# Patient Record
Sex: MALE | Race: WHITE | NOT HISPANIC OR LATINO
[De-identification: names, ages, dates, MRNs, and addresses within clinical notes are randomized per-mention and may not be internally consistent; named-entity substitution may affect disease eponyms.]

---

## 2017-02-07 ENCOUNTER — RECORDS - HEALTHEAST (OUTPATIENT)
Dept: ADMINISTRATIVE | Facility: OTHER | Age: 47
End: 2017-02-07

## 2017-11-07 ENCOUNTER — RECORDS - HEALTHEAST (OUTPATIENT)
Dept: ADMINISTRATIVE | Facility: OTHER | Age: 47
End: 2017-11-07

## 2018-01-16 ENCOUNTER — OFFICE VISIT - HEALTHEAST (OUTPATIENT)
Dept: FAMILY MEDICINE | Facility: CLINIC | Age: 48
End: 2018-01-16

## 2018-01-16 DIAGNOSIS — Z23 IMMUNIZATION DUE: ICD-10-CM

## 2018-01-16 DIAGNOSIS — L73.9 NASAL FOLLICULITIS: ICD-10-CM

## 2018-01-16 ASSESSMENT — MIFFLIN-ST. JEOR: SCORE: 1639.74

## 2018-11-24 ENCOUNTER — OFFICE VISIT (OUTPATIENT)
Dept: URGENT CARE | Facility: URGENT CARE | Age: 48
End: 2018-11-24
Payer: COMMERCIAL

## 2018-11-24 VITALS
WEIGHT: 175 LBS | DIASTOLIC BLOOD PRESSURE: 74 MMHG | HEART RATE: 66 BPM | BODY MASS INDEX: 27.47 KG/M2 | SYSTOLIC BLOOD PRESSURE: 110 MMHG | OXYGEN SATURATION: 98 % | TEMPERATURE: 97.6 F | HEIGHT: 67 IN
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
## 2018-11-24 DIAGNOSIS — S51.851A INFECTED CAT BITE OF FOREARM, RIGHT, INITIAL ENCOUNTER: Primary | ICD-10-CM

## 2018-11-24 DIAGNOSIS — L08.9 INFECTED CAT BITE OF FOREARM, RIGHT, INITIAL ENCOUNTER: Primary | ICD-10-CM

## 2018-11-24 PROCEDURE — 99203 OFFICE O/P NEW LOW 30 MIN: CPT | Mod: 25 | Performed by: INTERNAL MEDICINE

## 2018-11-24 PROCEDURE — 90471 IMMUNIZATION ADMIN: CPT | Performed by: INTERNAL MEDICINE

## 2018-11-24 PROCEDURE — 90715 TDAP VACCINE 7 YRS/> IM: CPT | Performed by: INTERNAL MEDICINE

## 2018-11-24 RX ORDER — ESCITALOPRAM OXALATE 10 MG/1
15 TABLET ORAL
COMMUNITY
Start: 2018-10-03

## 2018-11-24 RX ORDER — TEMAZEPAM 15 MG/1
CAPSULE ORAL
COMMUNITY
Start: 2018-10-23

## 2018-11-24 ASSESSMENT — ENCOUNTER SYMPTOMS
FATIGUE: 1
FEVER: 0

## 2018-11-24 NOTE — PATIENT INSTRUCTIONS
augmentin 875 2 x day 1 week  Yogurt, probiotics    Call or return to clinic if symptoms worsen or fail to improve as anticipated.          Cat Bite    A cat bite can cause a wound deep enough to break the skin. In such cases, the wound is cleaned and then sometimes closed. If the wound is closed it is usually not closed completely. This is so that fluid can drain if the wound becomes infected. Often the wound is left open to heal. In addition to wound care, a tetanus shot may be given, if needed.  Home care    Wash your hands well with soap and warm water before and after caring for the wound. This helps lower the risk of infection.    Care for the wound as directed. If a dressing was applied to the wound, be sure to change it as directed.    If the wound bleeds, place a clean, soft cloth on the wound. Then firmly apply pressure until the bleeding stops. This may take up to 5 minutes. Don't release the pressure and look at the wound during this time.    Always get medical attention for cat bites on the hand. They are highly likely to become infected.    Most wounds heal within 10 days. But an infection can occur even with proper treatment. So be sure to check the wound daily for signs of infection (see below).    Antibiotics may be prescribed. These help prevent or treat infection. If you re given antibiotics, take them as directed. Also be sure to complete the medicines.  Rabies prevention  Rabies is a virus that can be carried in certain animals. These can include domestic animals such as cats and dogs. Pets fully vaccinated against rabies (2 shots) are at very low risk of infection. But because human rabies is almost always fatal, any biting pet should be confined for 10 days as an extra precaution. In general, if there is a risk for rabies, the following steps may need to be taken:    If someone s pet cat has bitten you, it should be kept in a secure area for the next 10 days to watch for signs of illness. If  the pet owner won t allow this, contact your local animal control center. If the cat becomes ill or dies during that time, contact your local animal control center at once so the animal may be tested for rabies. If the cat stays healthy for the next 10 days, there is no danger of rabies in the animal or you.    If a stray cat bit you, contact your local animal control center. They can give information on capture, quarantine, and animal rabies testing.    If you can t find the animal that bit you in the next 2 days, and if rabies exists in your area, you may need to receive the rabies vaccine series. Call your healthcare provider right away. Or return to the emergency department promptly.    All animal bites should be reported to the local animal control center. If you were not given a form to fill out, you can report this yourself.  Follow-up care  Follow up with your healthcare provider, or as directed.  When to seek medical advice  Call your healthcare provider right away if any of these occur:    Signs of infection:  ? Spreading redness or warmth from the wound  ? Increased pain or swelling  ? Fever of 100.4 F (38 C) or higher, or as directed by your healthcare provider  ? Colored fluid or pus draining from the wound  ? Enlarged lymph nodes above the area that was bitten, such as lymph nodes in the armpit if you were bitten on the hand or arm. This may be a sign of cat-scratch disease (cat-scratch fever).    Signs of rabies infection:  ? Headache  ? Confusion  ? Strange behavior  ? Increased salivating or drooling  ? Seizure    Decreased ability to move any body part near the bite area    Bleeding that can't be stopped after 5 minutes of firm pressure   Date Last Reviewed: 5/1/2018 2000-2018 [employer]. [address] [place] [address]. All rights reserved. This information is not intended as a substitute for professional medical care. Always follow your healthcare professional's  instructions.

## 2018-11-24 NOTE — PROGRESS NOTES
"SUBJECTIVE:   Kam Rose is a 48 year old male presenting with a chief complaint of   Chief Complaint   Patient presents with     Urgent Care     Cat Bite     c/o cat bite for 1 day       He is a new patient of [facility].    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  Cat is up-to-date on shots  Last night this cat bit him twice on his right forearm.  He states the bites were very painful and deep  He noticed this morning developing redness and swelling around 6 of the bite sites  Current and Associated symptoms: redness, warmth and pain  Treatment measures tried include: washed, bactine, h2O2, alcohol        Review of Systems   Constitutional: Positive for fatigue. Negative for fever.     He has a history of postconcussion syndrome and did have some word difficulty finding today    Past Medical History:   Diagnosis Date     Post concussive syndrome      No family history on file.  Current Outpatient Prescriptions   Medication Sig Dispense Refill     amoxicillin-clavulanate (AUGMENTIN) 875-125 MG per tablet Take 1 tablet by mouth 2 times daily for 7 days 14 tablet 0     escitalopram (LEXAPRO) 10 MG tablet Take 15 mg by mouth       temazepam (RESTORIL) 15 MG capsule Take 1 cap po q HS, limit 30 caps per month.       Social History   Substance Use Topics     Smoking status: Never Smoker     Smokeless tobacco: Never Used     Alcohol use Not on file       OBJECTIVE  /74  Pulse 66  Temp 97.6  F (36.4  C) (Tympanic)  Ht 5' 7\" (1.702 m)  Wt 175 lb (79.4 kg)  SpO2 98%  BMI 27.41 kg/m2    Physical Exam   Constitutional: He appears well-developed and well-nourished.   Musculoskeletal:   right upper forearm    6 puncture wounds appear infected with redness & swelling  10 total puncture wounds   Psychiatric: He has a normal mood and affect. His behavior is normal. Judgment and thought content normal.   Vitals reviewed.      Labs:  No " results found for this or any previous visit (from the past 24 hour(s)).        ASSESSMENT:      ICD-10-CM    1. Infected cat bite of forearm, right, initial encounter S51.851A amoxicillin-clavulanate (AUGMENTIN) 875-125 MG per tablet    L08.9 TDAP VACCINE (ADACEL)    W55.01XA         Medical Decision Making:  Initiated on oral Augmentin for infected cat bites, this includes coverage of Pasteurella  Areas of infection were outlined  He is to watch for fevers, spreading and recheck with concerns  PLAN:      Patient Instructions     augmentin 875 2 x day 1 week  Yogurt, probiotics    Call or return to clinic if symptoms worsen or fail to improve as anticipated.          Cat Bite    A cat bite can cause a wound deep enough to break the skin. In such cases, the wound is cleaned and then sometimes closed. If the wound is closed it is usually not closed completely. This is so that fluid can drain if the wound becomes infected. Often the wound is left open to heal. In addition to wound care, a tetanus shot may be given, if needed.  Home care    Wash your hands well with soap and warm water before and after caring for the wound. This helps lower the risk of infection.    Care for the wound as directed. If a dressing was applied to the wound, be sure to change it as directed.    If the wound bleeds, place a clean, soft cloth on the wound. Then firmly apply pressure until the bleeding stops. This may take up to 5 minutes. Don't release the pressure and look at the wound during this time.    Always get medical attention for cat bites on the hand. They are highly likely to become infected.    Most wounds heal within 10 days. But an infection can occur even with proper treatment. So be sure to check the wound daily for signs of infection (see below).    Antibiotics may be prescribed. These help prevent or treat infection. If you re given antibiotics, take them as directed. Also be sure to complete the medicines.  Rabies  prevention  Rabies is a virus that can be carried in certain animals. These can include domestic animals such as cats and dogs. Pets fully vaccinated against rabies (2 shots) are at very low risk of infection. But because human rabies is almost always fatal, any biting pet should be confined for 10 days as an extra precaution. In general, if there is a risk for rabies, the following steps may need to be taken:    If someone s pet cat has bitten you, it should be kept in a secure area for the next 10 days to watch for signs of illness. If the pet owner won t allow this, contact your local animal control center. If the cat becomes ill or dies during that time, contact your local animal control center at once so the animal may be tested for rabies. If the cat stays healthy for the next 10 days, there is no danger of rabies in the animal or you.    If a stray cat bit you, contact your local animal control center. They can give information on capture, quarantine, and animal rabies testing.    If you can t find the animal that bit you in the next 2 days, and if rabies exists in your area, you may need to receive the rabies vaccine series. Call your healthcare provider right away. Or return to the emergency department promptly.    All animal bites should be reported to the local animal control center. If you were not given a form to fill out, you can report this yourself.  Follow-up care  Follow up with your healthcare provider, or as directed.  When to seek medical advice  Call your healthcare provider right away if any of these occur:    Signs of infection:  ? Spreading redness or warmth from the wound  ? Increased pain or swelling  ? Fever of 100.4 F (38 C) or higher, or as directed by your healthcare provider  ? Colored fluid or pus draining from the wound  ? Enlarged lymph nodes above the area that was bitten, such as lymph nodes in the armpit if you were bitten on the hand or arm. This may be a sign of cat-scratch  disease (cat-scratch fever).    Signs of rabies infection:  ? Headache  ? Confusion  ? Strange behavior  ? Increased salivating or drooling  ? Seizure    Decreased ability to move any body part near the bite area    Bleeding that can't be stopped after 5 minutes of firm pressure   Date Last Reviewed: 5/1/2018 2000-2018 [employer]. [address] [place] [address]. All rights reserved. This information is not intended as a substitute for professional medical care. Always follow your healthcare professional's instructions.            .

## 2018-11-24 NOTE — MR AVS SNAPSHOT
After Visit Summary   11/24/2018    Kam Rose    MRN: [id number]           Patient Information     Date Of Birth          1970        Visit Information        Provider Department      11/24/2018 2:30 PM Ros Marques MD [facility]        Today's Diagnoses     Infected cat bite of forearm, right, initial encounter    -  1      Care Instructions    augmentin 875 2 x day 1 week  Yogurt, probiotics    Call or return to clinic if symptoms worsen or fail to improve as anticipated.          Cat Bite    A cat bite can cause a wound deep enough to break the skin. In such cases, the wound is cleaned and then sometimes closed. If the wound is closed it is usually not closed completely. This is so that fluid can drain if the wound becomes infected. Often the wound is left open to heal. In addition to wound care, a tetanus shot may be given, if needed.  Home care    Wash your hands well with soap and warm water before and after caring for the wound. This helps lower the risk of infection.    Care for the wound as directed. If a dressing was applied to the wound, be sure to change it as directed.    If the wound bleeds, place a clean, soft cloth on the wound. Then firmly apply pressure until the bleeding stops. This may take up to 5 minutes. Don't release the pressure and look at the wound during this time.    Always get medical attention for cat bites on the hand. They are highly likely to become infected.    Most wounds heal within 10 days. But an infection can occur even with proper treatment. So be sure to check the wound daily for signs of infection (see below).    Antibiotics may be prescribed. These help prevent or treat infection. If you re given antibiotics, take them as directed. Also be sure to complete the medicines.  Rabies prevention  Rabies is a virus that can be carried in certain animals. These can include domestic animals such as cats and dogs. Pets fully  vaccinated against rabies (2 shots) are at very low risk of infection. But because human rabies is almost always fatal, any biting pet should be confined for 10 days as an extra precaution. In general, if there is a risk for rabies, the following steps may need to be taken:    If someone s pet cat has bitten you, it should be kept in a secure area for the next 10 days to watch for signs of illness. If the pet owner won t allow this, contact your local animal control center. If the cat becomes ill or dies during that time, contact your local animal control center at once so the animal may be tested for rabies. If the cat stays healthy for the next 10 days, there is no danger of rabies in the animal or you.    If a stray cat bit you, contact your local animal control center. They can give information on capture, quarantine, and animal rabies testing.    If you can t find the animal that bit you in the next 2 days, and if rabies exists in your area, you may need to receive the rabies vaccine series. Call your healthcare provider right away. Or return to the emergency department promptly.    All animal bites should be reported to the local animal control center. If you were not given a form to fill out, you can report this yourself.  Follow-up care  Follow up with your healthcare provider, or as directed.  When to seek medical advice  Call your healthcare provider right away if any of these occur:    Signs of infection:  ? Spreading redness or warmth from the wound  ? Increased pain or swelling  ? Fever of 100.4 F (38 C) or higher, or as directed by your healthcare provider  ? Colored fluid or pus draining from the wound  ? Enlarged lymph nodes above the area that was bitten, such as lymph nodes in the armpit if you were bitten on the hand or arm. This may be a sign of cat-scratch disease (cat-scratch fever).    Signs of rabies infection:  ? Headache  ? Confusion  ? Strange behavior  ? Increased salivating or  "drooling  ? Seizure    Decreased ability to move any body part near the bite area    Bleeding that can't be stopped after 5 minutes of firm pressure   Date Last Reviewed: 2018-2018 [employer]. [address] [place] [address], [place] [address]. All rights reserved. This information is not intended as a substitute for professional medical care. Always follow your healthcare professional's instructions.                Follow-ups after your visit        Who to contact     If you have questions or need follow up information about today's clinic visit or your schedule please contact [facility] directly at [phone].  Normal or non-critical lab and imaging results will be communicated to you by Crashmobhart, letter or phone within 4 business days after the clinic has received the results. If you do not hear from us within 7 days, please contact the clinic through Crash[employer]hart or phone. If you have a critical or abnormal lab result, we will notify you by phone as soon as possible.  Submit refill requests through [employer] App or call your pharmacy and they will forward the refill request to us. Please allow 3 business days for your refill to be completed.          Additional Information About Your Visit        Crashmobhart Information     [employer] App lets you send messages to your doctor, view your test results, renew your prescriptions, schedule appointments and more. To sign up, go to [email or web address] . Click on \"Log in\" on the left side of the screen, which will take you to the Welcome page. Then click on \"Sign up Now\" on the right side of the page.     You will be asked to enter the access code listed below, as well as some personal information. Please follow the directions to create your username and password.     Your access code is: [email or web address]  Expires: 2019  3:03 PM     Your access code will  in 90 days. If you need help or a new code, please call your [facility] clinic or " "[phone].        Care EveryWhere ID     This is your Care EveryWhere ID. This could be used by other organizations to access your [facility] medical records  [id number]        Your Vitals Were     Pulse Temperature Height Pulse Oximetry BMI (Body Mass Index)       66 97.6  F (36.4  C) (Tympanic) 5' 7\" (1.702 m) 98% 27.41 kg/m2        Blood Pressure from Last 3 Encounters:   11/24/18 110/74    Weight from Last 3 Encounters:   11/24/18 175 lb (79.4 kg)              We Performed the Following     TDAP VACCINE (ADACEL)          Today's Medication Changes          These changes are accurate as of 11/24/18  3:03 PM.  If you have any questions, ask your nurse or doctor.               Start taking these medicines.        Dose/Directions    amoxicillin-clavulanate 875-125 MG per tablet   Commonly known as:  AUGMENTIN   Used for:  Infected cat bite of forearm, right, initial encounter   Started by:  Ros Marques MD        Dose:  1 tablet   Take 1 tablet by mouth 2 times daily for 7 days   Quantity:  14 tablet   Refills:  0            Where to get your medicines      These medications were sent to [id number] - [place], [place] - [address], [place] [address]     Phone:  [phone]     amoxicillin-clavulanate 875-125 MG per tablet                Primary Care Provider Fax #    Physician No Ref-Primary [phone]       No address on file        Equal Access to Services     DIPESH TILLMAN AH: Briana ortizo Sokassy, waaxda luqadaha, qaybta kaalmada adeegyada, waxliam lizzie aguilar. So [facility] [place] [facility] [phone].    ATENCIÓN: Si habla español, tiene a burgos disposición servicios gratuitos de asistencia lingüística. Llame al [phone].    We comply with applicable federal civil rights laws and [place] laws. We do not discriminate on the basis of race, color, national origin, age, disability, sex, sexual orientation, or gender " identity.            Thank you!     Thank you for choosing [facility]  for your care. Our goal is always to provide you with excellent care. Hearing back from our patients is one way we can continue to improve our services. Please take a few minutes to complete the written survey that you may receive in the mail after your visit with us. Thank you!             Your Updated Medication List - Protect others around you: Learn how to safely use, store and throw away your medicines at www.disposemymeds.org.          This list is accurate as of 11/24/18  3:03 PM.  Always use your most recent med list.                   Brand Name Dispense Instructions for use Diagnosis    amoxicillin-clavulanate 875-125 MG per tablet    AUGMENTIN    14 tablet    Take 1 tablet by mouth 2 times daily for 7 days    Infected cat bite of forearm, right, initial encounter       escitalopram 10 MG tablet    LEXAPRO     Take 15 mg by mouth        temazepam 15 MG capsule    RESTORIL     Take 1 cap po q HS, limit 30 caps per month.

## 2019-09-10 ENCOUNTER — OFFICE VISIT - HEALTHEAST (OUTPATIENT)
Dept: FAMILY MEDICINE | Facility: CLINIC | Age: 49
End: 2019-09-10

## 2019-09-10 ENCOUNTER — COMMUNICATION - HEALTHEAST (OUTPATIENT)
Dept: SCHEDULING | Facility: CLINIC | Age: 49
End: 2019-09-10

## 2019-09-10 DIAGNOSIS — K04.7 TOOTH INFECTION: ICD-10-CM

## 2019-09-10 ASSESSMENT — ANXIETY QUESTIONNAIRES
7. FEELING AFRAID AS IF SOMETHING AWFUL MIGHT HAPPEN: NOT AT ALL
1. FEELING NERVOUS, ANXIOUS, OR ON EDGE: SEVERAL DAYS
2. NOT BEING ABLE TO STOP OR CONTROL WORRYING: SEVERAL DAYS
GAD7 TOTAL SCORE: 5
5. BEING SO RESTLESS THAT IT IS HARD TO SIT STILL: NOT AT ALL
3. WORRYING TOO MUCH ABOUT DIFFERENT THINGS: SEVERAL DAYS
6. BECOMING EASILY ANNOYED OR IRRITABLE: SEVERAL DAYS
4. TROUBLE RELAXING: SEVERAL DAYS

## 2019-09-10 ASSESSMENT — MIFFLIN-ST. JEOR: SCORE: 1623.87

## 2019-09-10 ASSESSMENT — PATIENT HEALTH QUESTIONNAIRE - PHQ9: SUM OF ALL RESPONSES TO PHQ QUESTIONS 1-9: 6

## 2019-09-11 ENCOUNTER — COMMUNICATION - HEALTHEAST (OUTPATIENT)
Dept: SCHEDULING | Facility: CLINIC | Age: 49
End: 2019-09-11

## 2019-09-11 DIAGNOSIS — K04.7 DENTAL ABSCESS: ICD-10-CM

## 2021-05-26 ASSESSMENT — PATIENT HEALTH QUESTIONNAIRE - PHQ9: SUM OF ALL RESPONSES TO PHQ QUESTIONS 1-9: 6

## 2021-05-28 ASSESSMENT — ANXIETY QUESTIONNAIRES: GAD7 TOTAL SCORE: 5

## 2021-05-31 VITALS — WEIGHT: 183 LBS | HEIGHT: 67 IN | BODY MASS INDEX: 28.72 KG/M2

## 2021-06-01 NOTE — TELEPHONE ENCOUNTER
"    Call from pt       CC:  Swelling in face   - R side     > Starting over the past weekend     > Whole cheek R sided     > No problems swallowing      > Some swelling now up to around eye     > No fever       > Seen by DDS a few months ago - possible \"root issue\" back then       A/P:   > Appt with provider this afternoon   > ICE and APAP / IBU prn until seen        Anthony Camejo, RN   Triage and Medication Refills           Reason for Disposition    Swelling is painful to touch    Protocols used: FACE SWELLING-A-OH      "

## 2021-06-01 NOTE — TELEPHONE ENCOUNTER
Seen yesterday for swollen face.  Took first pill of antibiotic and threw up for hours.    Patient calling for different antibiotic.    amoxicillin-clavulanate (AUGMENTIN) 875-125 mg per tablet 20 tablet 0 9/10/2019 9/24/2019    Sig - Route: Take 1 tablet by mouth 2 (two) times a day for 14 days. - Oral      Swelling not as bad today.  Provider yesterday almost sent him to ER but since he feels he is improving, he will wait.    No fever. Has been using advil and ice.    Pharmacy confirmed.    Ok to leave detailed message    Tiffany Judge, RN, [facility] Nurse Triage/Med Refills RN

## 2021-06-01 NOTE — TELEPHONE ENCOUNTER
Contacted patient.  Relayed message from Dr. Yelitza Villalobos.  No further questions.    Lisa Holm, RN  Triage Nurse Advisor

## 2021-06-01 NOTE — TELEPHONE ENCOUNTER
Please inform patient that a New Rx faxed for clindamycin three times a day for 10 days.  Patient needs to be seen by a dentist as this may only be a temporary measure.

## 2021-06-01 NOTE — PROGRESS NOTES
"Assessment & Plan  1. Tooth infection  Discussed that the patient needs to be seen by a facial surgeon.  Discussed the risks of delay in treatment including infection of the bones of the face, airways and death.  He does not have any systemic symptoms today.  Advised that if he does get worse, then he should be seen in the ER for IV antibiotics and for a CT scan.    Will prescribe Augmentin two times a day for 14 days    - amoxicillin-clavulanate (AUGMENTIN) 875-125 mg per tablet; Take 1 tablet by mouth 2 (two) times a day for 14 days.  Dispense: 20 tablet; Refill: 0      Shahida Zimmerman MD    Subjective  Chief Complaint:  Facial Swelling ((R)since yesterday, has been using tylenol, ice pack)    HPI:   Kam Rose is a 49 y.o. male who presents for facial swelling. He has had a \"lump\" in the roof of his mouth for about 1 year.  A dentist had recommended that he may need a root canal. He started having more pain and pressure there for the last 2 days.  He then woke up yesterday with some facial swelling that is worse today.    He does not have any fevers, chills, difficulty swallowing or breathing.  He has been tolerating normal PO. He has had poor dentition  No cough, no runny nose    Last dental visit 2 months ago.     Immunizations  routine    Allergies:  has No Known Allergies.    SH/FH:  Social History and Family History reviewed and updated.   Tobacco Status:  He  reports that he has never smoked. He has never used smokeless tobacco.    Review of Systems:    Constitutional: No Weight Change, No Fever, No Chills, No Night Sweats,   ENT/Mouth: No Hearing Changes, No sore throat, No Rhinorrhea, No Swallowing Difficulty   Eyes: No Vision Changes   Cardiovascular: No Chest Pain  Respiratory: No Cough  Skin: No Skin Lesions  Neuro: No Headache  Psych:  Depression      Objective  Vitals:    09/10/19 1257   BP: 102/64   Pulse: 66   SpO2: 96%   Weight: 179 lb 8 oz (81.4 kg)   Height: 5' 6.75\" (1.695 m) "       Physical Exam:  GENERAL: Alert, well-appearing, in no acute distress.   PSYCH: Pleasant mood, affect appropriate.  Good eye contact.  SKIN: swelling of the right face around the nose and over the cheekbone.  No overlying cellulitis  HEAD: Normocephalic, atraumatic, slightly tender over area that is swollen. No palpable abscess or induration  EYES: Conjunctiva pink, sclera white, no exudates. CRISTINA.  EOMs intact.   MOUTH: poor dentition.  Tenderness and swelling over gums of the right upper jaw slightly lateral midline.  No tenderness on palpation of the parotid.   NECK: No lymphadenopathy.   RESP: No increased work of breathing.  Lung sounds bilateral, clear, symmetric excursion.   Neuro: cranial nerves intact.  Facial sensation on right altered but grossly intact.  Asymmetry caused by swelling, though muscle strength of facial muscles is symmetric; smile is symmetric

## 2021-06-01 NOTE — PATIENT INSTRUCTIONS - HE
I think you have a dental infection.  This can be very dangerous, because the infection can spread to the bones of your face and to the spaces of your neck.  These infections MUST be treated by drainage by a dental surgeon.  I will prescribe antibiotics today, but it will not be cured unless the abscess is taken care of by a dentist.      You need to contact your dental insurance provider to find an In Network location, or return to your prior dentist.    If you think the swelling is worse, you have fevers, you have difficulty opening your mouth or swallowing then you need to be seen in the ER -- they may need to do a CT scan and IV antibiotics.

## 2021-06-03 VITALS
HEART RATE: 66 BPM | OXYGEN SATURATION: 96 % | SYSTOLIC BLOOD PRESSURE: 102 MMHG | DIASTOLIC BLOOD PRESSURE: 64 MMHG | HEIGHT: 67 IN | BODY MASS INDEX: 28.17 KG/M2 | WEIGHT: 179.5 LBS

## 2021-06-15 NOTE — PROGRESS NOTES
"Assessment and Plan    1. Nasal folliculitis - discussed minimizing irritations.  Consider daily saline spray. Used bactroban for episodes  - mupirocin (BACTROBAN) 2 % cream; Apply to affected area 2 times daily for at least 5 days  Dispense: 30 g; Refill: 1    2. Immunization due  He will come back another time for this  - Tdap vaccine,  8yo or older,  IM; Standing     Honoring choices document given to Kam and discussed  Jacquelyn Plasencia MD     -------------------------------------------    Chief Complaint   Patient presents with     Concerns     pimples in nose-not currently, starts in the spring and keeps having them. Pt does clip his nose hair.  No bleeding at all.        Kam says he has been getting \"weird pimples since the spring in his right nostril.\"  He had never had these before but since spring gets them semi-regularly.  Not clear how often he gets these.  Usually lasts a week, can come once a month or less. Had last week - whole side of nose got swollen.  He clips his nose hairs with cuticle scissors - the he did get new scissors in the spring.  Has been clipping nose hairs his whole life.  Doesn't remember this process damaging things.  He says he blows his nose a lot, but since his concussion last year he doesn't get runny noses as much as he used to.    Other follow up   I last saw Kam on 10/18/16 for pre op for left hip replacement.  He says his  hip is much better - he will get his  right hip replaced eventually    Kam also has a history of several  Concussions: one from playing football in 20s; three concussions in the last year : 1 at work, 1 at band practice space , 1 with minimal head collision while wearing a helmet and playing hockey. He is part of TBI clinic at [facility] [place].  He had been getting over nausea, belching and headaches from previous concussion,   then bumped his head on a door with a recurrence of  headache, nausea and energy drain.  He is continuing to follow with the [facility] [place] TBI " "clinic and says he is encourage that \"molasses drained out of brain a little\" since the beginning of 2018.  However he also found recently that eyes don't work in tandem - he will be getting therapy for this    He has depression followed elsewhere - he feels symptoms are well controlled on Lexapro  Little interest or pleasure in doing things: Several days  Feeling down, depressed, or hopeless: Several days  Trouble falling or staying asleep, or sleeping too much: Several days  Feeling tired or having little energy: Several days  Poor appetite or overeating: Several days  Feeling bad about yourself - or that you are a failure or have let yourself or your family down: Several days  Trouble concentrating on things, such as reading the newspaper or watching television: Not at all  Moving or speaking so slowly that other people could have noticed. Or the opposite - being so fidgety or restless that you have been moving around a lot more than usual: Not at all  Thoughts that you would be better off dead, or of hurting yourself in some way: Not at all  PHQ-9 Total Score: 6  If you checked off any problems, how difficult have these problems made it for you to do your work, take care of things at home, or get along with other people?: Somewhat difficult      Patient Active Problem List   Diagnosis     Allergic Rhinitis     Mild Recurrent Major Depression     Lumbar Radiculopathy     Blurry Vision     Concussion     TBI (traumatic brain injury), sequela     Cannabis dependence     Circadian rhythm disorder     Dysthymia     Primary insomnia     Osteoarthritis, hip, bilateral       Current Outpatient Prescriptions on File Prior to Visit   Medication Sig Dispense Refill     escitalopram oxalate (LEXAPRO) 20 MG tablet Take 20 mg by mouth.       temazepam (RESTORIL) 15 mg capsule 1-2 caps po qhs       celecoxib (CELEBREX) 100 MG capsule Take 100 mg by mouth 2 (two) times a day.       celecoxib (CELEBREX) 200 MG capsule Take 200 " mg by mouth.       cyproheptadine (PERIACTIN) 4 mg tablet Take one tablet (4 mg) daily for 7 days, then one tablet bid for 7 days.  Then take 1.5 tablets (6 mg) bid       No current facility-administered medications on file prior to visit.            Health Maintenance Due   Topic Date Due     TD 18+ HE  01/16/1988     TDAP ADULT ONE TIME DOSE  01/16/1988     Health Maintenance reviewed - Honoring choices and PhQ9 done today.  He does not want his Tdap today    History   Smoking Status     Never Smoker   Smokeless Tobacco     Never Used       History   Alcohol Use No       Vitals:    01/16/18 1547   BP: 108/62   Pulse: (!) 59   SpO2: 98%     Body mass index is 28.88 kg/(m^2).     EXAM:    General appearance - alert, well appearing, and in no distress  Nose - mucosa normal and patent - there is a slight subcutaneous swelling just inside right lateral nostril - size of small pea